# Patient Record
Sex: MALE | Race: WHITE
[De-identification: names, ages, dates, MRNs, and addresses within clinical notes are randomized per-mention and may not be internally consistent; named-entity substitution may affect disease eponyms.]

---

## 2018-09-12 ENCOUNTER — HOSPITAL ENCOUNTER (OUTPATIENT)
Dept: HOSPITAL 59 - SUR | Age: 73
Discharge: HOME | End: 2018-09-12
Attending: PAIN MEDICINE
Payer: MEDICARE

## 2018-09-12 DIAGNOSIS — M54.16: ICD-10-CM

## 2018-09-12 DIAGNOSIS — E78.00: ICD-10-CM

## 2018-09-12 DIAGNOSIS — I10: ICD-10-CM

## 2018-09-12 DIAGNOSIS — M06.9: ICD-10-CM

## 2018-09-12 DIAGNOSIS — I25.10: ICD-10-CM

## 2018-09-12 DIAGNOSIS — M54.14: Primary | ICD-10-CM

## 2018-09-12 DIAGNOSIS — Z86.73: ICD-10-CM

## 2018-09-12 DIAGNOSIS — Z95.5: ICD-10-CM

## 2018-09-12 PROCEDURE — 63655 IMPLANT NEUROELECTRODES: CPT

## 2018-09-12 PROCEDURE — 95972 ALYS CPLX SP/PN NPGT W/PRGRM: CPT

## 2018-09-12 PROCEDURE — 63685 INS/RPLC SPI NPG/RCVR POCKET: CPT

## 2018-09-12 PROCEDURE — 72020 X-RAY EXAM OF SPINE 1 VIEW: CPT

## 2018-09-12 PROCEDURE — 85002 BLEEDING TIME TEST: CPT

## 2018-09-12 PROCEDURE — 00630 ANES PX LUMBAR REGION NOS: CPT

## 2018-09-12 NOTE — HISTORY AND PHYSICAL - FERRO
CHIEF COMPLAINT/HISTORY OF CHIEF COMPLAINT:  This patient presents with a 
history of an intractable thoracic and lumbar radiculopathy.  Due to the 
failure of all therapies a spinal cord stimulator trial was conducted on 7/10/
18 with 75+% pain control.  Due to the failure of all therapy and the success 
of the trial the patient presents today for implantation of a permanent system.
  



PAST MEDICAL HISTORY:  Hypertension and reflux esophagitis.



PAST SURGICAL HISTORY:  Foot surgery and ankle fusion.  



MEDICATIONS ON ADMISSION:  List to be provided.  



ALLERGIES:  PENICILLIN AND BACTRIM.  



FAMILY/PSYCHOSOCIAL HISTORY:  Social history - Noncontributory.  Family history 
- Coronary artery disease, cardiac, hypertension and cancer.  



SYSTEMS REVIEW:  The patient is appropriate in no acute distress.  The 
remainder of the systems reviews is positive for glasses, dentures, blood 
pressure, reflux. and degenerative arthritis.



PHYSICAL EXAMINATION:  Height is 6'2", weight is 300 pounds.  No vital signs.  

HEENT:  Within normal limits.  

LUNGS:  Clear.

HEART:  Regular rate and rhythm.  

ABDOMEN:  Nontender.  

MUSCULOSKELETAL:  Examination of the musculoskeletal system shows a mid to 
upper thoracic component extending all the way into the low back.  There are 
radicular elements across the chest wall intercostals right and extending along 
the lumbar nerve roots with the lumbar radiculopathy mostly posterior legs.  
Motor and sensory field evaluation is intact.  No specific motor weakness.  
Ambulation - No assistive device utilized.  

NEUROLOGIC:  Cranial nerves are intact.  



IMPRESSION:  

THORACIC AND LUMBAR RADICULOPATHY, ICD-10 CODE M54.14 AND M54.16.  



PLAN:  Due to the failure of all therapy and the success of a stimulator trial, 
the patient presents today for implantation of a permanent system.  The 
potential risks, side effects, and complications have all been carefully 
reviewed and discussed.  The procedure will be considered outpatient although 
an overnight stay will be evaluated.  





JOB NUMBER:  [id number]
MTDD

## 2018-09-13 NOTE — OPERATIVE NOTE
DATE OF SURGERY:     09/12/18



PREOPERATIVE DIAGNOSES:  THORACIC AND LUMBAR RADICULOPATHY, ICD-10 CODE = 
M54.14 AND M54.16.



OPERATION:  

1. FLUOROSCOPICALLY-GUIDED LEFT EPIDURAL ACCESS T10-11. PLACEMENT OF SPINAL 
CORD STIMULATOR LEAD 1, [employer] INFINION 16 WITH 6 ELECTRODES POSITIONED RIGHT 
OF THE MIDLINE T1. 

2. FLUOROSCOPICALLY-GUIDED EPIDURAL ACCESS LEFT T11-12. PLACEMENT OF SPINAL 
CORD STIMULATOR LEAD 2, [employer] INFINION 16 WITH 6 ELECTRODES POSITIONED RIGHT 
LATERAL TO LEAD 1 AT T1. 

3. COMPLEX PROGRAMMING OF LEAD 1 OVER 20 MINUTES FOLLOWED BY COMPLEX 
PROGRAMMING OF LEAD 2 OVER 20 MINUTES. 

4. INCISION, SUBCUTANEOUS DISSECTION, AND ANCHORING OF LEAD 1 AND LEAD 2 TO 
SUPRASPINOUS FASCIA WITH [employer] SCIENTIFIC LOCKING ANCHORING. 

5. INCISION, SUBCUTANEOUS DISSECTION, AND CREATION OF SUBCUTANEOUS POUCH RIGHT 
FLANK GENERATOR IDENTIFIED AS Ener1 SCIENTIFIC PROGRAMMABLE RECHARGEABLE 
WAVEWRITER.  

6. TUNNELING BETWEEN LEAD POUCH. PLACEMENT OF EXTERNAL LEAD 1 AND LEAD 2 INTO 
GENERATOR POUCH, EACH LEAD INTERFACED TO GENERATOR. 

7. PLACEMENT OF GENERATOR INTO POUCH SECURED TO POSTERIOR FASCIA WITH 
NONABSORBABLE SUTURE. PLACEMENT OF LEADS INTO POUCH. CLOSURE OF BOTH INCISIONS 
STRATAFIX, 2-0 FASCIA, 3-0 SKIN. DERMABOND CLOSURE. 

8. COMPLEX RECOVERY ROOM PROGRAMMING INTERNAL GENERATOR, HOME USE, TWO 
STIMULATORS 20 MINUTES. . 



SURGEON:        MARCO ANTONIO SANCHEZ D.O. 



ANESTHESIA:     LOCAL SEDATION.



ANESTHESIA PROVIDER:  EASTON LOPEZ CRNA



INDICATION:  This patient presents with a history of intractable lumbar 
radiculopathy and thoracic radiculopathy. Due to the failure of all therapies, 
a spinal cord stimulator trial was conducted with 75 to 85% pain control. Due 
to the failure of all therapies and the success of the trial, he is here for 
implantation of a permanent system.  



PROCEDURE:  Intravenous line, vital sign monitoring, IV sedation by Anesthesia, 
[place] Lopez. Patient position prone. Sterile prep, sterile technique. From the 
left, the epidural interspace at 10-11, 11-12 were marked, infiltrated. Using 
two separate curved axis Epimed needles with loss-of-resistance, the space was 
accessed. At 10-11, spinal cord stimulator lead 1, a [employer] Infinion 
16 with 6 electrodes positioned right of the midline T1. With the epidural 
access at 11-12, spinal cord stimulator lead 2, [employer] Infinion 16 
with 6 electrodes positioned right of the midline lateral to lead 1 also at T1. 
Complex programming of lead 1 over 20 minutes followed by complex programming 
of lead 2 over 20 minutes resulting in complete patterns of stimulation across 
the back or along rib margins into the back and legs; patient indicating we had 
all the areas of his pain. He was given the option to implant, continue to 
program, or remove; he opted to implant. Questions repeated with the same 
response. He opted to continue. He was re-sedated.  The skin below both needles 
infiltrated, incision made, and subcutaneous dissection was conducted to the 
supraspinous fascia. Each lead was then anchored to the supraspinous fascia 
with a [employer] [place]. At the right flank; a site picked by 
the patient for the generator, skin infiltrated, incision made, and 
subcutaneous dissection was conducted to form a pouch of suitable size for the 
generator identified as a [employer] Programmable Rechargeable 
WaveWriter. Antibiotic irrigation and Bovie for hemostasis. A tunneling tool 
was used to carry the leads into the generator pouch and then each lead 
interfaced to the generator. The generator was placed into the pouch, secured 
to the posterior fascia with nonabsorbable suture and then both incisions were 
closed using STRATAFIX suture, 2-0 fascia, 3-0 skin. Dermabond closure over the 
wounds. The patient was transported to the Recovery Room stable, no side-
effects from the procedure or the sedation. When fully awake and alert, complex 
programming was then performed over 20 minutes re-establishing stimulation and 
pain control to all of the appropriate areas. He was requesting to go home.  



DISCHARGE INSTRUCTIONS: 

1. Sites will remain dry. He may shower but not sit in water.  

2. Standard medications resumed including Levaquin, the antibiotic, 500 mg once 
a day for 14 days or substitute Cipro 500 twice a day. 

3. All other medications resumed. He will keep his activities low until the 
office contacts him in the next 24-48 hours to set up an appointment in 7-10 
days to see the sites. Through that period of time, he should keep his 
activities controlled. He will be seen in the office. All other instructions 
provided, numbers to contact with problems given. He was then discharged.  





cc: Dr. Mckay   

JOB NUMBER:[id number]
[facility]

## 2018-09-16 NOTE — RADIOLOGY REPORT
EXAM:  SPINE, 1 VIEW



HISTORY:  POST SPINAL CANAL STIMULATOR IMPLANT.



TECHNIQUE:  Single AP portable view of the thoracic spine.



COMPARISON:  None.



FINDINGS:  There are two wires that extend up to the approximate level of the 
T1 vertebra consistent with spinal stimulator wires. Thoracic curve to the 
right. Pacemaker battery pack right upper chest with the electrodes leads 
extending into the region of the right atrium and right ventricle. 



IMPRESSION:  



THE SPINAL STIMULATOR WIRES EXTEND UP TO THE T1 LEVEL.



JOB NUMBER:  [id number]
MTDD